# Patient Record
Sex: FEMALE | Race: ASIAN | NOT HISPANIC OR LATINO | Employment: STUDENT | URBAN - METROPOLITAN AREA
[De-identification: names, ages, dates, MRNs, and addresses within clinical notes are randomized per-mention and may not be internally consistent; named-entity substitution may affect disease eponyms.]

---

## 2023-06-16 ENCOUNTER — HOSPITAL ENCOUNTER (EMERGENCY)
Facility: CLINIC | Age: 23
Discharge: HOME | End: 2023-06-16
Attending: EMERGENCY MEDICINE | Admitting: EMERGENCY MEDICINE
Payer: COMMERCIAL

## 2023-06-16 VITALS
TEMPERATURE: 98.5 F | SYSTOLIC BLOOD PRESSURE: 105 MMHG | DIASTOLIC BLOOD PRESSURE: 61 MMHG | HEART RATE: 70 BPM | OXYGEN SATURATION: 97 % | RESPIRATION RATE: 22 BRPM

## 2023-06-16 DIAGNOSIS — R21 RASH: ICD-10-CM

## 2023-06-16 DIAGNOSIS — R22.0 SWELLING AROUND BOTH EYES: ICD-10-CM

## 2023-06-16 LAB
ALBUMIN SERPL BCG-MCNC: 4 G/DL (ref 3.5–5.2)
ALBUMIN UR-MCNC: NEGATIVE MG/DL
ALP SERPL-CCNC: 57 U/L (ref 35–104)
ALT SERPL W P-5'-P-CCNC: 13 U/L (ref 0–50)
ANION GAP SERPL CALCULATED.3IONS-SCNC: 11 MMOL/L (ref 7–15)
APPEARANCE UR: CLEAR
AST SERPL W P-5'-P-CCNC: 16 U/L (ref 0–45)
BASOPHILS # BLD AUTO: 0 10E3/UL (ref 0–0.2)
BASOPHILS NFR BLD AUTO: 0 %
BILIRUB SERPL-MCNC: 0.3 MG/DL
BILIRUB UR QL STRIP: NEGATIVE
BUN SERPL-MCNC: 12.4 MG/DL (ref 6–20)
CALCIUM SERPL-MCNC: 9 MG/DL (ref 8.6–10)
CHLORIDE SERPL-SCNC: 105 MMOL/L (ref 98–107)
COLOR UR AUTO: ABNORMAL
CREAT SERPL-MCNC: 0.73 MG/DL (ref 0.51–0.95)
DEPRECATED HCO3 PLAS-SCNC: 23 MMOL/L (ref 22–29)
EOSINOPHIL # BLD AUTO: 0.6 10E3/UL (ref 0–0.7)
EOSINOPHIL NFR BLD AUTO: 7 %
ERYTHROCYTE [DISTWIDTH] IN BLOOD BY AUTOMATED COUNT: 12.4 % (ref 10–15)
GFR SERPL CREATININE-BSD FRML MDRD: >90 ML/MIN/1.73M2
GLUCOSE SERPL-MCNC: 90 MG/DL (ref 70–99)
GLUCOSE UR STRIP-MCNC: NEGATIVE MG/DL
GROUP A STREP BY PCR: NOT DETECTED
HCG UR QL: NEGATIVE
HCT VFR BLD AUTO: 42.5 % (ref 35–47)
HGB BLD-MCNC: 13.7 G/DL (ref 11.7–15.7)
HGB UR QL STRIP: NEGATIVE
IMM GRANULOCYTES # BLD: 0 10E3/UL
IMM GRANULOCYTES NFR BLD: 0 %
KETONES UR STRIP-MCNC: ABNORMAL MG/DL
LEUKOCYTE ESTERASE UR QL STRIP: ABNORMAL
LYMPHOCYTES # BLD AUTO: 1.7 10E3/UL (ref 0.8–5.3)
LYMPHOCYTES NFR BLD AUTO: 19 %
MCH RBC QN AUTO: 29.8 PG (ref 26.5–33)
MCHC RBC AUTO-ENTMCNC: 32.2 G/DL (ref 31.5–36.5)
MCV RBC AUTO: 92 FL (ref 78–100)
MONOCYTES # BLD AUTO: 0.4 10E3/UL (ref 0–1.3)
MONOCYTES NFR BLD AUTO: 4 %
MUCOUS THREADS #/AREA URNS LPF: PRESENT /LPF
NEUTROPHILS # BLD AUTO: 6 10E3/UL (ref 1.6–8.3)
NEUTROPHILS NFR BLD AUTO: 70 %
NITRATE UR QL: NEGATIVE
NRBC # BLD AUTO: 0 10E3/UL
NRBC BLD AUTO-RTO: 0 /100
PH UR STRIP: 6.5 [PH] (ref 5–7)
PLATELET # BLD AUTO: 189 10E3/UL (ref 150–450)
POTASSIUM SERPL-SCNC: 3.9 MMOL/L (ref 3.4–5.3)
PROT SERPL-MCNC: 6.8 G/DL (ref 6.4–8.3)
RBC # BLD AUTO: 4.6 10E6/UL (ref 3.8–5.2)
RBC URINE: 2 /HPF
SODIUM SERPL-SCNC: 139 MMOL/L (ref 136–145)
SP GR UR STRIP: 1.02 (ref 1–1.03)
SQUAMOUS EPITHELIAL: 1 /HPF
UROBILINOGEN UR STRIP-MCNC: NORMAL MG/DL
WBC # BLD AUTO: 8.8 10E3/UL (ref 4–11)
WBC URINE: 1 /HPF

## 2023-06-16 PROCEDURE — 80053 COMPREHEN METABOLIC PANEL: CPT | Performed by: EMERGENCY MEDICINE

## 2023-06-16 PROCEDURE — 85025 COMPLETE CBC W/AUTO DIFF WBC: CPT | Performed by: EMERGENCY MEDICINE

## 2023-06-16 PROCEDURE — 250N000013 HC RX MED GY IP 250 OP 250 PS 637: Performed by: EMERGENCY MEDICINE

## 2023-06-16 PROCEDURE — 87086 URINE CULTURE/COLONY COUNT: CPT | Performed by: EMERGENCY MEDICINE

## 2023-06-16 PROCEDURE — 36415 COLL VENOUS BLD VENIPUNCTURE: CPT | Performed by: EMERGENCY MEDICINE

## 2023-06-16 PROCEDURE — 250N000012 HC RX MED GY IP 250 OP 636 PS 637: Performed by: EMERGENCY MEDICINE

## 2023-06-16 PROCEDURE — 81025 URINE PREGNANCY TEST: CPT | Performed by: EMERGENCY MEDICINE

## 2023-06-16 PROCEDURE — 81001 URINALYSIS AUTO W/SCOPE: CPT | Performed by: EMERGENCY MEDICINE

## 2023-06-16 PROCEDURE — 87651 STREP A DNA AMP PROBE: CPT | Performed by: EMERGENCY MEDICINE

## 2023-06-16 PROCEDURE — 99284 EMERGENCY DEPT VISIT MOD MDM: CPT | Performed by: EMERGENCY MEDICINE

## 2023-06-16 PROCEDURE — 99283 EMERGENCY DEPT VISIT LOW MDM: CPT | Performed by: EMERGENCY MEDICINE

## 2023-06-16 RX ORDER — DIPHENHYDRAMINE HCL 50 MG
50 CAPSULE ORAL ONCE
Status: COMPLETED | OUTPATIENT
Start: 2023-06-16 | End: 2023-06-16

## 2023-06-16 RX ORDER — PREDNISONE 20 MG/1
40 TABLET ORAL ONCE
Status: COMPLETED | OUTPATIENT
Start: 2023-06-16 | End: 2023-06-16

## 2023-06-16 RX ORDER — PREDNISONE 20 MG/1
20 TABLET ORAL DAILY
Qty: 10 TABLET | Refills: 0 | Status: SHIPPED | OUTPATIENT
Start: 2023-06-16

## 2023-06-16 RX ORDER — PREDNISONE 20 MG/1
TABLET ORAL
Qty: 10 TABLET | Refills: 0 | Status: SHIPPED | OUTPATIENT
Start: 2023-06-16

## 2023-06-16 RX ADMIN — DIPHENHYDRAMINE HYDROCHLORIDE 50 MG: 50 CAPSULE ORAL at 17:48

## 2023-06-16 RX ADMIN — PREDNISONE 40 MG: 20 TABLET ORAL at 22:12

## 2023-06-16 ASSESSMENT — ACTIVITIES OF DAILY LIVING (ADL)
ADLS_ACUITY_SCORE: 35

## 2023-06-16 NOTE — ED PROVIDER NOTES
[facility] [place] [facility] ([facility])  6/16/23    History     Chief Complaint   Patient presents with     Allergic Reaction     HPI  Jenn Hernandez is an otherwise healthy 22 year old female who presents to the ED for evaluation of facial swelling and rash.  Patient reports she feels as though she is having an allergic reaction, but has not noted an allergen.  She states that yesterday she began feeling some rough skin on her face which progressed into facial swelling that is hot on her cheeks and around her eyes.  She denies throat swelling, tongue swelling, or shortness of breath.  She denies associated chest pain, dizziness, or syncope.    Patient also reports a rash developing on her right thigh which has become red and has persisted.  She states the rash is not itchy.  Does have some rough skin, but only true red rash is on her right thigh.  Patient did try Benadryl which reportedly helped the facial swelling somewhat, but not the rash.  Also tried Zyrtec without any change.      She states that she is from [place] and came here yesterday as her boyfriend has worked here, but the symptoms started at approximately 2 PM yesterday before she left her home.  She denies any new medications, foods, detergents, or lotions.  Patient reports that her symptoms did worsen this morning, but have been constant throughout the day without any change.  Patient has been staying in a hotel, but the symptoms did start before she got there.  She denies fever, chills, rhinorrhea, vomiting, or diarrhea.  She denies taking any daily medications.  She denies any  symptoms or changes.  Patient denies any rash on her hands or feet.  Patient denies any sore throat or other strep infections symptoms.    Past Medical History  History reviewed. No pertinent past medical history.  No past surgical history on file.  predniSONE (DELTASONE) 20 MG tablet  predniSONE (DELTASONE) 20 MG tablet      No Known Allergies  Family  History  No family history on file.  Social History          A medically appropriate review of systems was performed with pertinent positives and negatives noted in the HPI, and all other systems negative.    Physical Exam   BP: 105/61  Pulse: 83  Temp: 98.6  F (37  C)  Resp: 22  SpO2: 97 %  Physical Exam   General: awake, alert, NAD  Head: normal cephalic  HEENT: pupils equal, conjugate gaze intact.  Mild erythema the posterior oropharynx.  No swelling of the lips, tongue, or posterior oropharynx.  No rashes or lesions noted in the posterior oropharynx.  Normal appearing conjunctiva.  Patient does have some mild swelling noted in the bilateral eyelids  Neck: Supple  CV: regular rate and rhythm without murmur  Lungs: clear to auscultation  Abd: soft, non-tender, no guarding, no peritoneal signs  EXT: lower extremities without swelling or edema  Neuro: awake, answers questions appropriately. No focal deficits noted   Skin: Patient has 1 area over her inner thigh that has a red raised small lesions that are not urticarial in appearance.  The remainder of the skin appears slightly raised, palpable papules over the lower extremities and upper extremities.    ED Course, Procedures, & Data     5:02 PM  The patient was seen and examined by Donis Shea MD in Room ED03.   Procedures            Results for orders placed or performed during the hospital encounter of 06/16/23   Comprehensive metabolic panel     Status: Normal   Result Value Ref Range    Sodium 139 136 - 145 mmol/L    Potassium 3.9 3.4 - 5.3 mmol/L    Chloride 105 98 - 107 mmol/L    Carbon Dioxide (CO2) 23 22 - 29 mmol/L    Anion Gap 11 7 - 15 mmol/L    Urea Nitrogen 12.4 6.0 - 20.0 mg/dL    Creatinine 0.73 0.51 - 0.95 mg/dL    Calcium 9.0 8.6 - 10.0 mg/dL    Glucose 90 70 - 99 mg/dL    Alkaline Phosphatase 57 35 - 104 U/L    AST 16 0 - 45 U/L    ALT 13 0 - 50 U/L    Protein Total 6.8 6.4 - 8.3 g/dL    Albumin 4.0 3.5 - 5.2 g/dL    Bilirubin Total 0.3 <=1.2  mg/dL    GFR Estimate >90 >60 mL/min/1.73m2   HCG qualitative urine     Status: Normal   Result Value Ref Range    hCG Urine Qualitative Negative Negative   UA with Microscopic reflex to Culture     Status: Abnormal    Specimen: Urine, Midstream   Result Value Ref Range    Color Urine Light Yellow Colorless, Straw, Light Yellow, Yellow    Appearance Urine Clear Clear    Glucose Urine Negative Negative mg/dL    Bilirubin Urine Negative Negative    Ketones Urine Trace (A) Negative mg/dL    Specific Gravity Urine 1.016 1.003 - 1.035    Blood Urine Negative Negative    pH Urine 6.5 5.0 - 7.0    Protein Albumin Urine Negative Negative mg/dL    Urobilinogen Urine Normal Normal, 2.0 mg/dL    Nitrite Urine Negative Negative    Leukocyte Esterase Urine Small (A) Negative    Mucus Urine Present (A) None Seen /LPF    RBC Urine 2 <=2 /HPF    WBC Urine 1 <=5 /HPF    Squamous Epithelials Urine 1 <=1 /HPF    Narrative    Urine Culture not indicated   CBC with platelets and differential     Status: None   Result Value Ref Range    WBC Count 8.8 4.0 - 11.0 10e3/uL    RBC Count 4.60 3.80 - 5.20 10e6/uL    Hemoglobin 13.7 11.7 - 15.7 g/dL    Hematocrit 42.5 35.0 - 47.0 %    MCV 92 78 - 100 fL    MCH 29.8 26.5 - 33.0 pg    MCHC 32.2 31.5 - 36.5 g/dL    RDW 12.4 10.0 - 15.0 %    Platelet Count 189 150 - 450 10e3/uL    % Neutrophils 70 %    % Lymphocytes 19 %    % Monocytes 4 %    % Eosinophils 7 %    % Basophils 0 %    % Immature Granulocytes 0 %    NRBCs per 100 WBC 0 <1 /100    Absolute Neutrophils 6.0 1.6 - 8.3 10e3/uL    Absolute Lymphocytes 1.7 0.8 - 5.3 10e3/uL    Absolute Monocytes 0.4 0.0 - 1.3 10e3/uL    Absolute Eosinophils 0.6 0.0 - 0.7 10e3/uL    Absolute Basophils 0.0 0.0 - 0.2 10e3/uL    Absolute Immature Granulocytes 0.0 <=0.4 10e3/uL    Absolute NRBCs 0.0 10e3/uL   Group A Streptococcus PCR Throat Swab     Status: Normal    Specimen: Throat; Swab   Result Value Ref Range    Group A strep by PCR Not Detected Not Detected     Narrative    The Xpert Xpress Strep A test, performed on the [employer], is a rapid, qualitative in vitro diagnostic test for the detection of Streptococcus pyogenes (Group A ß-hemolytic Streptococcus, Strep A) in throat swab specimens from patients with signs and symptoms of pharyngitis. The Xpert Xpress Strep A test can be used as an aid in the diagnosis of Group A Streptococcal pharyngitis. The assay is not intended to monitor treatment for Group A Streptococcus infections. The Xpert Xpress Strep A test utilizes an automated real-time polymerase chain reaction (PCR) to detect Streptococcus pyogenes DNA.   CBC with platelets differential     Status: None    Narrative    The following orders were created for panel order CBC with platelets differential.  Procedure                               Abnormality         Status                     ---------                               -----------         ------                     CBC with platelets and d...[[id number]]                      Final result                 Please view results for these tests on the individual orders.     Medications   diphenhydrAMINE (BENADRYL) capsule 50 mg (50 mg Oral $Given 6/16/23 8510)   predniSONE (DELTASONE) tablet 40 mg (40 mg Oral $Given 6/16/23 8933)     Labs Ordered and Resulted from Time of ED Arrival to Time of ED Departure - No data to display  No orders to display          Critical care was not performed.     Medical Decision Making  The patient's presentation was of moderate complexity (an undiagnosed new problem with uncertain diagnosis).    The patient's evaluation involved:  ordering and/or review of 3+ test(s) in this encounter (see separate area of note for details)    The patient's management necessitated moderate risk (prescription drug management including medications given in the ED).      Assessment & Plan    Jenn is a 22-year-old female without past medical history who presents with rash and  facial swelling.  It is unclear the cause of the rash, this does not appear frankly urticarial in nature.  There is no concerning features to the rash it is not on the mucosa, has not petechial, it is slightly erythematous and raised.  Patient does have appreciable swelling on her eyelids.    Considering this improved with Benadryl did consider that this could be related to allergic reaction though patient has no signs or symptoms of anaphylaxis.  Patient does not meet obvious triggers but counseled that she should continue to look for these.  I did also consider this could be a scarlet fever rash given the raised rash noted on her upper and lower extremities though she denies sore throat or fever.  We will test for strep pharyngitis.    I did give Benadryl as she reported this did help her swelling.    I did consider other causes of swelling like a nephrotic syndrome.  I did basic labs and a UA which did not show any evidence of nephrotic syndrome with no protein in the urine, no large amount of RBCs, normal creatinine.  Normal CBC with no left shift.  Patient reported no significant improvement with Benadryl though everything remained basically unchanged no progression of symptoms.  Strep negative.    Patient has normal vital signs, no signs of anaphylaxis on exam, otherwise well-appearing and no worrisome signs or symptoms on history so unclear diagnosis we will have her continue to monitor symptoms at home.  Return to the ER if worse.  Follow-up with PCP.    We will try a short course of prednisone to see if this helps with her symptoms.    I have reviewed the nursing notes. I have reviewed the findings, diagnosis, plan and need for follow up with the patient.    There are no discharge medications for this patient.      Final diagnoses:   Rash   Swelling around both eyes     IMian, am serving as a trained medical scribe to document services personally performed by Donis Shea MD, based on the  provider's statements to me.     I, Donis Sheffield MD, was physically present and have reviewed and verified the accuracy of this note documented by Mian Hawkins.    Donis Sheffield MD  [facility] EMERGENCY DEPARTMENT  6/16/2023     Donis Sheffield MD  06/16/23 6742

## 2023-06-16 NOTE — ED NOTES
Patient took benadryl 25 mg this morning and that helped, now the rash, redness and swelling is worse again; denies throat problems or difficulty breathing

## 2023-06-16 NOTE — ED NOTES
Bed: ED03  Expected date: 6/16/23  Expected time: 4:30 PM  Means of arrival:   Comments:  Adult pt tx from PEDS ED  Allergic reaction

## 2023-06-17 NOTE — DISCHARGE INSTRUCTIONS
Please monitor your symptoms closely.  For any new or worsening symptoms please return to the emergency department.    Your blood work was normal.  Your kidney function was normal.  You can try taking an over-the-counter antihistamine to see if this helps your symptoms.    Please follow-up with your primary care doctor if symptoms do not improve.  Please return to the ER if you get new or worsening symptoms.

## 2023-06-19 LAB — BACTERIA UR CULT: NORMAL

## 2023-06-19 NOTE — RESULT ENCOUNTER NOTE
Final urine culture report is negative.  Adult Negative Urine culture parameters per protocol: Any # Urogenital single or mixed organism, <10,000 col/ml single organism (cath/midstream), and > 3 organisms (No susceptibilities performed).  [facility] Emergency Dept discharge antibiotic prescribed (If applicable): None  Treatment recommendations per [facility] ED Lab Result Urine Culture protocol.